# Patient Record
Sex: MALE | Race: BLACK OR AFRICAN AMERICAN | Employment: OTHER | URBAN - METROPOLITAN AREA
[De-identification: names, ages, dates, MRNs, and addresses within clinical notes are randomized per-mention and may not be internally consistent; named-entity substitution may affect disease eponyms.]

---

## 2020-06-10 ENCOUNTER — OFFICE VISIT (OUTPATIENT)
Dept: PRIMARY CARE CLINIC | Age: 65
End: 2020-06-10

## 2020-06-12 LAB
SARS-COV-2: NOT DETECTED
SOURCE: NORMAL

## 2020-06-15 ENCOUNTER — ANESTHESIA EVENT (OUTPATIENT)
Dept: ENDOSCOPY | Age: 65
End: 2020-06-15
Payer: MEDICARE

## 2020-06-15 RX ORDER — SODIUM CHLORIDE 9 MG/ML
INJECTION, SOLUTION INTRAVENOUS CONTINUOUS
Status: CANCELLED | OUTPATIENT
Start: 2020-06-15

## 2020-06-16 ENCOUNTER — ANESTHESIA (OUTPATIENT)
Dept: ENDOSCOPY | Age: 65
End: 2020-06-16
Payer: MEDICARE

## 2020-06-16 ENCOUNTER — HOSPITAL ENCOUNTER (OUTPATIENT)
Age: 65
Setting detail: OUTPATIENT SURGERY
Discharge: HOME | End: 2020-06-16
Attending: INTERNAL MEDICINE | Admitting: INTERNAL MEDICINE
Payer: MEDICARE

## 2020-06-16 VITALS
OXYGEN SATURATION: 99 % | DIASTOLIC BLOOD PRESSURE: 68 MMHG | SYSTOLIC BLOOD PRESSURE: 94 MMHG | TEMPERATURE: 96.8 F | RESPIRATION RATE: 14 BRPM

## 2020-06-16 VITALS
HEIGHT: 68 IN | HEART RATE: 71 BPM | OXYGEN SATURATION: 100 % | WEIGHT: 185 LBS | TEMPERATURE: 97.6 F | RESPIRATION RATE: 18 BRPM | BODY MASS INDEX: 28.04 KG/M2 | SYSTOLIC BLOOD PRESSURE: 112 MMHG | DIASTOLIC BLOOD PRESSURE: 72 MMHG

## 2020-06-16 LAB
GLUCOSE BLD-MCNC: 97 MG/DL (ref 70–99)
PERFORMED ON: NORMAL

## 2020-06-16 PROCEDURE — 3609020100 HC COLONOSCOPY W/EUS FNA: Performed by: INTERNAL MEDICINE

## 2020-06-16 PROCEDURE — 7100000010 HC PHASE II RECOVERY - FIRST 15 MIN: Performed by: INTERNAL MEDICINE

## 2020-06-16 PROCEDURE — 3700000001 HC ADD 15 MINUTES (ANESTHESIA): Performed by: INTERNAL MEDICINE

## 2020-06-16 PROCEDURE — 6360000002 HC RX W HCPCS: Performed by: NURSE ANESTHETIST, CERTIFIED REGISTERED

## 2020-06-16 PROCEDURE — 2580000003 HC RX 258: Performed by: ANESTHESIOLOGY

## 2020-06-16 PROCEDURE — 2709999900 HC NON-CHARGEABLE SUPPLY: Performed by: INTERNAL MEDICINE

## 2020-06-16 PROCEDURE — 3609010200 HC COLONOSCOPY ABLATION TUMOR POLYP/OTHER LES: Performed by: INTERNAL MEDICINE

## 2020-06-16 PROCEDURE — 88305 TISSUE EXAM BY PATHOLOGIST: CPT

## 2020-06-16 PROCEDURE — 2720000010 HC SURG SUPPLY STERILE: Performed by: INTERNAL MEDICINE

## 2020-06-16 PROCEDURE — 3700000000 HC ANESTHESIA ATTENDED CARE: Performed by: INTERNAL MEDICINE

## 2020-06-16 PROCEDURE — 7100000011 HC PHASE II RECOVERY - ADDTL 15 MIN: Performed by: INTERNAL MEDICINE

## 2020-06-16 RX ORDER — SODIUM CHLORIDE, SODIUM LACTATE, POTASSIUM CHLORIDE, CALCIUM CHLORIDE 600; 310; 30; 20 MG/100ML; MG/100ML; MG/100ML; MG/100ML
INJECTION, SOLUTION INTRAVENOUS CONTINUOUS
Status: DISCONTINUED | OUTPATIENT
Start: 2020-06-16 | End: 2020-06-16 | Stop reason: HOSPADM

## 2020-06-16 RX ORDER — SODIUM CHLORIDE 0.9 % (FLUSH) 0.9 %
10 SYRINGE (ML) INJECTION EVERY 12 HOURS SCHEDULED
Status: DISCONTINUED | OUTPATIENT
Start: 2020-06-16 | End: 2020-06-16 | Stop reason: HOSPADM

## 2020-06-16 RX ORDER — LEVOCETIRIZINE DIHYDROCHLORIDE 5 MG/1
TABLET, FILM COATED ORAL DAILY
COMMUNITY

## 2020-06-16 RX ORDER — PROPOFOL 10 MG/ML
INJECTION, EMULSION INTRAVENOUS PRN
Status: DISCONTINUED | OUTPATIENT
Start: 2020-06-16 | End: 2020-06-16 | Stop reason: SDUPTHER

## 2020-06-16 RX ORDER — LIDOCAINE HYDROCHLORIDE 20 MG/ML
INJECTION, SOLUTION INTRAVENOUS PRN
Status: DISCONTINUED | OUTPATIENT
Start: 2020-06-16 | End: 2020-06-16 | Stop reason: SDUPTHER

## 2020-06-16 RX ORDER — SODIUM CHLORIDE 0.9 % (FLUSH) 0.9 %
10 SYRINGE (ML) INJECTION PRN
Status: DISCONTINUED | OUTPATIENT
Start: 2020-06-16 | End: 2020-06-16 | Stop reason: HOSPADM

## 2020-06-16 RX ORDER — INSULIN GLARGINE 100 [IU]/ML
INJECTION, SOLUTION SUBCUTANEOUS
COMMUNITY
Start: 2020-02-19

## 2020-06-16 RX ADMIN — PROPOFOL 100 MG: 10 INJECTION, EMULSION INTRAVENOUS at 08:07

## 2020-06-16 RX ADMIN — PROPOFOL 40 MG: 10 INJECTION, EMULSION INTRAVENOUS at 08:36

## 2020-06-16 RX ADMIN — PROPOFOL 50 MG: 10 INJECTION, EMULSION INTRAVENOUS at 08:21

## 2020-06-16 RX ADMIN — PROPOFOL 50 MG: 10 INJECTION, EMULSION INTRAVENOUS at 08:28

## 2020-06-16 RX ADMIN — LIDOCAINE HYDROCHLORIDE 60 MG: 20 INJECTION, SOLUTION INTRAVENOUS at 08:07

## 2020-06-16 RX ADMIN — SODIUM CHLORIDE, POTASSIUM CHLORIDE, SODIUM LACTATE AND CALCIUM CHLORIDE: 600; 310; 30; 20 INJECTION, SOLUTION INTRAVENOUS at 07:34

## 2020-06-16 RX ADMIN — PROPOFOL 30 MG: 10 INJECTION, EMULSION INTRAVENOUS at 08:41

## 2020-06-16 RX ADMIN — PROPOFOL 50 MG: 10 INJECTION, EMULSION INTRAVENOUS at 08:11

## 2020-06-16 RX ADMIN — PROPOFOL 50 MG: 10 INJECTION, EMULSION INTRAVENOUS at 08:17

## 2020-06-16 RX ADMIN — PROPOFOL 30 MG: 10 INJECTION, EMULSION INTRAVENOUS at 08:45

## 2020-06-16 RX ADMIN — PROPOFOL 30 MG: 10 INJECTION, EMULSION INTRAVENOUS at 08:13

## 2020-06-16 SDOH — HEALTH STABILITY: MENTAL HEALTH: HOW MANY STANDARD DRINKS CONTAINING ALCOHOL DO YOU HAVE ON A TYPICAL DAY?: 1 OR 2

## 2020-06-16 SDOH — HEALTH STABILITY: MENTAL HEALTH: HOW OFTEN DO YOU HAVE A DRINK CONTAINING ALCOHOL?: 4 OR MORE TIMES A WEEK

## 2020-06-16 ASSESSMENT — PULMONARY FUNCTION TESTS
PIF_VALUE: 0
PIF_VALUE: 5
PIF_VALUE: 0

## 2020-06-16 ASSESSMENT — LIFESTYLE VARIABLES: SMOKING_STATUS: 0

## 2020-06-16 ASSESSMENT — PAIN - FUNCTIONAL ASSESSMENT: PAIN_FUNCTIONAL_ASSESSMENT: 0-10

## 2020-06-16 NOTE — PROGRESS NOTES
Ambulatory Surgery/Procedure Discharge Note    Vitals:    06/16/20 0918   BP: 112/72   Pulse: 71   Resp: 18   Temp: 97.6 °F (36.4 °C)   SpO2: 100%       In: 675 [P.O.:75; I.V.:600]  Out: 0     Restroom use offered before discharge. Yes    Pain assessment:  none  Pain Level: 0        Patient discharged to home/self care.  Patient discharged via wheel chair by transporter to waiting family/S.O.       6/16/2020 9:39 AM

## 2020-06-16 NOTE — ANESTHESIA PRE PROCEDURE
reviewed and Nursing notes reviewed no history of anesthetic complications:   Airway: Mallampati: I  TM distance: >3 FB   Neck ROM: full  Mouth opening: > = 3 FB Dental: normal exam   (+) upper dentures  Comment: Partial lower    Pulmonary: breath sounds clear to auscultation      (-) not a current smoker (1 cig qd)          Patient did not smoke on day of surgery. Cardiovascular:  Exercise tolerance: good (>4 METS),   (+) hypertension (no meds for ): mild,     (-) past MI    NYHA Classification: I    Rhythm: regular  Rate: normal           Beta Blocker:  Not on Beta Blocker         Neuro/Psych:   Negative Neuro/Psych ROS              GI/Hepatic/Renal:        (-) GERD      ROS comment: Rectal mass. Endo/Other:    (+) DiabetesType II DM, using insulin, . Abdominal:           Vascular: negative vascular ROS. Anesthesia Plan      MAC     ASA 3       Induction: intravenous. MIPS: Prophylactic antiemetics administered. Anesthetic plan and risks discussed with patient. Plan discussed with CRNA.     Attending anesthesiologist reviewed and agrees with Pre Eval content              Lloyd Sanchez DO   6/16/2020

## 2020-06-16 NOTE — PROCEDURES
EBL none  [facility]  ENDOSCOPIC ULTRASOUND (EUS) REPORT    Patient:  Chris Olivier    Referring Physician:  Noemi Jennings     Indication:  Rectal nodule     Medications:  MAC      Pre-Anesthesia Assessment:  I have reviewed and am in agreement with patient history and medication, including previous response to sedation in the nursing record. A full history and physical and physical was performed. I discussed the risks and benefits of the procedure with the patient including but not limited to infection, bleeding, perforation, medication reaction, and death. In addition I discussed that fine needle aspiration may result in an increased risk of infection, bleeding, perforation, and death. The patient elected to have the procedure performed and informed consent was obtained. The patient was brought to the room, a time out was performed and the patient and staff were in agreement that it was the correct patient and procedure. Mallampatti: II  ASA Grade Assessment: 2    The plan was to use MAC anesthesia. Vital signs and heart rhythm were monitored prior to and throughout the entire procedure. The patient was reassessed and then adequate sedation was then provided in stepwise fashion. The heart rate, respiratory rate, oxygen saturations, blood pressure, adequacy of pulmonary ventilation, and response to care were monitored throughout the procedure. The physical status of the patient was re-assessed after the procedure. The gastroscope and Olympus radial echoendoscope were introduced through the rectum, and advanced to the sigmoid colon. The lower EUS was accomplished without difficulty. The patient tolerated the procedure well. An endosonoscope with 7. 5MHz processor and color doppler were used throughout the procedure. I performed real time sonographic image interpretation without the assistance of a radiologist.     There were no immediate apparent complications.     Rectal Exam: normal    Endoscopic Findings:  Colon: distal sigmoid normal  Rectum: 6mm subepithelial nodule at 1o'clock with patient in left lateral position. Approximately 2cms from the anal verge. Sonographic Findings:  Rectum:the nodule measures 6 x 3.7mm. The lesion is hypoechoic and doppler negative. The lesion arises from the submucosa and involves the muscularis mucosae. There is some submucosal layering deep to the lesion and an intact/uninvolved muscularis propria deep to the lesion. Lymph nodes: no iliac or otis-rectal lymph nodes    The gastroscope was readvanced. After the lesion was identified the EMR snare was used to place superficial marks around the lesion to larry the borders prior to lift. The scope was removed and the banding cap was placed. The scope was then readvanced to the lesion. A total of 5 mL of orise was placed for lift. The lesion lifted well. Next the lesion was suctioned into the banding cap and a band deployed. The lesion was then removed with a hot snare. The mucosal resection site showed a good blue hue suggesting that the resection was deep enough but there was no obvious perforation. There was no bleeding during or at the end of the procedure. Impression:  6 x 3.7mm hypoechoic nodule arising from the submucosal layer and involving the muscularis mucosae status post mucosal resection.      Plan:  Call for pathology in 7 days  Avoid NSAIDs for 14 days  Further follow up based on pathology results    January Cabrera MD

## 2021-08-10 ENCOUNTER — HOSPITAL ENCOUNTER (EMERGENCY)
Age: 66
Discharge: HOME | End: 2021-08-10
Payer: MEDICARE

## 2021-08-10 VITALS
RESPIRATION RATE: 16 BRPM | TEMPERATURE: 98.2 F | SYSTOLIC BLOOD PRESSURE: 123 MMHG | HEART RATE: 86 BPM | OXYGEN SATURATION: 97 % | DIASTOLIC BLOOD PRESSURE: 72 MMHG

## 2021-08-10 DIAGNOSIS — B02.29 POSTHERPETIC NEURALGIA: Primary | ICD-10-CM

## 2021-08-10 PROCEDURE — 99282 EMERGENCY DEPT VISIT SF MDM: CPT

## 2021-08-10 RX ORDER — OXYCODONE HYDROCHLORIDE AND ACETAMINOPHEN 5; 325 MG/1; MG/1
1 TABLET ORAL EVERY 6 HOURS PRN
Qty: 10 TABLET | Refills: 0 | Status: SHIPPED | OUTPATIENT
Start: 2021-08-10 | End: 2021-08-13

## 2021-08-10 ASSESSMENT — PAIN DESCRIPTION - ORIENTATION: ORIENTATION: RIGHT

## 2021-08-10 ASSESSMENT — ENCOUNTER SYMPTOMS
FACIAL SWELLING: 0
SHORTNESS OF BREATH: 0
BACK PAIN: 0
APNEA: 0
NAUSEA: 0
CHOKING: 0
EYE REDNESS: 0
VOMITING: 0
ABDOMINAL PAIN: 0
EYE DISCHARGE: 0

## 2021-08-10 ASSESSMENT — PAIN DESCRIPTION - PAIN TYPE: TYPE: ACUTE PAIN

## 2021-08-10 ASSESSMENT — PAIN DESCRIPTION - LOCATION: LOCATION: BACK

## 2021-08-10 ASSESSMENT — PAIN SCALES - GENERAL
PAINLEVEL_OUTOF10: 10
PAINLEVEL_OUTOF10: 10

## 2021-08-10 NOTE — ED PROVIDER NOTES
**ADVANCED PRACTICE PROVIDER, I HAVE EVALUATED THIS PATIENT**        [address] [facility] ENCOUNTER      Pt Name: Silviano Guidry  ZFP:[id number]  [place] 1955  Date of evaluation: 8/10/2021  Provider: Gustavo Billingsley PA-C      Chief Complaint:    Chief Complaint   Patient presents with    Herpes Zoster         Nursing Notes, Past Medical Hx, Past Surgical Hx, Social Hx, Allergies, and Family Hx were all reviewed and agreed with or any disagreements were addressed in the HPI.    HPI: (Location, Duration, Timing, Severity, Quality, Assoc Sx, Context, Modifying factors)  This is a  61 y.o. male who presents presents to the emergency room with chief complaint of pain. Patient state he broke out with shingles about over a week ago. He was seen by his primary doctor who put him on valacyclovir and Medrol Dosepak. He states that the pain is so severe he cannot sleep. Denies fever, no shortness of breath. No numbness or tingling in feet or finger. Shingles outbreak is on his back on upper right side.     PastMedical/Surgical History:      Diagnosis Date    Diabetes     Hyperlipidemia     Hypertension     Inflammatory arthropathy     Submucosal rectal lesion          Procedure Laterality Date    COLONOSCOPY  6/16/2020    COLONOSCOPY W/ ENDOSCOPIC MUCOSAL RESECTION performed by Roc Vazquez MD at [address] [place] [address]  6/16/2020    COLONOSCOPY W/EUS performed by Roc Vazquez MD at [facility] ENDOSCOPY       Medications:  Previous Medications    CRESTOR 10 MG TABLET        INSULIN GLARGINE (LANTUS SOLOSTAR) 100 UNIT/ML INJECTION PEN    INJECT 20 UNITS UNDER THE SKIN NIGHTLY    LEVOCETIRIZINE (XYZAL) 5 MG TABLET    Take by mouth daily    METFORMIN (GLUCOPHAGE) 500 MG TABLET    Take 1,000 mg by mouth 2 times daily (with meals)    SITAGLIPTIN (JANUVIA) 100 MG TABLET    Take 1 tablet by mouth daily         Review of Systems:  (2-9 systems needed)  Review of Systems   Constitutional: Negative for chills and fever. HENT: Negative for congestion, facial swelling and sneezing. Eyes: Negative for discharge and redness. Respiratory: Negative for apnea, choking and shortness of breath. Cardiovascular: Negative for chest pain. Gastrointestinal: Negative for abdominal pain, nausea and vomiting. Genitourinary: Negative for dysuria. Musculoskeletal: Negative for back pain, neck pain and neck stiffness. Neurological: Negative for dizziness, tremors, seizures and headaches. All other systems reviewed and are negative. \"Positives and Pertinent negatives as per HPI\"    Physical Exam:  Physical Exam  Vitals and nursing note reviewed. Constitutional:       Appearance: He is well-developed. Cardiovascular:      Rate and Rhythm: Normal rate and regular rhythm. Heart sounds: Normal heart sounds. No murmur heard. No friction rub. No gallop. Pulmonary:      Effort: Pulmonary effort is normal. No respiratory distress. Breath sounds: Normal breath sounds. No wheezing or rales. Chest:      Chest wall: No tenderness. Musculoskeletal:         General: Normal range of motion. Cervical back: Normal range of motion and neck supple. Skin:     General: Skin is warm and dry. Findings: Rash present. No lesion. Rash is vesicular. Neurological:      General: No focal deficit present. Mental Status: He is oriented to person, place, and time. Psychiatric:         Behavior: Behavior normal.         MEDICAL DECISION MAKING    Vitals:    Vitals:    08/10/21 0600 08/10/21 0620   BP: (!) 158/100 (!) 145/81   Pulse: 85 86   Resp: 16    Temp: 98.2 °F (36.8 °C)    TempSrc: Oral    SpO2: 100% 99%       LABS:Labs Reviewed - No data to display     Remainder of labs reviewed and were negative at this time or not returned at the time of this note.     RADIOLOGY:   Non-plain film images such as CT, Ultrasound and MRI are read by the radiologist. Israel Lu PA-C have directly visualized the radiologic plain film image(s) with the below findings:      Interpretation per the Radiologist below, if available at the time of this note:    No orders to display        No results found. MEDICAL DECISION MAKING / ED COURSE:      PROCEDURES:   Procedures    None    Patient was given:  Medications - No data to display    Emergency room course: Patient on exam cardiovascular regular rhythm, lungs are clear. No wheeze rales or rhonchi noted. Patient has a group of vesicular lesion on the right side upper back that extends just below the right axilla. No swelling noted no surrounding erythema. No other motor or sensory deficit noted. Is alert oriented x4. Does not appear to be in acute distress. At this time I did discuss patient and treatment plan. I will put him on oxycodone for pain. Informed him follow back up with his primary care physician. Informed him that this is most likely a post herpetic neuralgia and they can last anywhere from days to weeks to months to years. He will be discharged stable condition. The patient tolerated their visit well. I evaluated the patient. The physician was available for consultation as needed. The patient and / or the family were informed of the results of any tests, a time was given to answer questions, a plan was proposed and they agreed with plan. CLINICAL IMPRESSION:  1. Postherpetic neuralgia        DISPOSITION Decision To Discharge 08/10/2021 06:22:48 AM      PATIENT REFERRED TO:  Bhargav Bermeo  [address] [place] [id number] [phone]    Call   As needed, If symptoms worsen      DISCHARGE MEDICATIONS:  New Prescriptions    OXYCODONE-ACETAMINOPHEN (PERCOCET) 5-325 MG PER TABLET    Take 1 tablet by mouth every 6 hours as needed for Pain for up to 3 days.        DISCONTINUED MEDICATIONS:  Discontinued Medications    No medications on file              (Please note the MDM and HPI sections of this note were completed with a voice recognition program.  Efforts were made to edit the dictations but occasionally words are mis-transcribed.)    Electronically signed, Yessy Magallanes PA-C,          Yessy Magallanes PA-C  08/10/21 7308

## 2021-08-10 NOTE — ED TRIAGE NOTES
Patient came in from home complaining of shingles pain. States he has had singles for two weeks and given medication last week. States pain is 10/10 on the right side of his back. States he has been taking his medication as prescribed.  A&O x4, vss.

## 2023-11-29 ENCOUNTER — HOSPITAL ENCOUNTER (EMERGENCY)
Age: 68
Discharge: HOME | End: 2023-11-29
Attending: EMERGENCY MEDICINE
Payer: MEDICARE

## 2023-11-29 VITALS
BODY MASS INDEX: 28.12 KG/M2 | DIASTOLIC BLOOD PRESSURE: 67 MMHG | SYSTOLIC BLOOD PRESSURE: 136 MMHG | TEMPERATURE: 98.5 F | RESPIRATION RATE: 17 BRPM | HEART RATE: 88 BPM | WEIGHT: 184.97 LBS | OXYGEN SATURATION: 99 %

## 2023-11-29 DIAGNOSIS — S05.02XA ABRASION OF LEFT CORNEA, INITIAL ENCOUNTER: Primary | ICD-10-CM

## 2023-11-29 PROCEDURE — 6370000000 HC RX 637 (ALT 250 FOR IP): Performed by: PHYSICIAN ASSISTANT

## 2023-11-29 PROCEDURE — 99283 EMERGENCY DEPT VISIT LOW MDM: CPT

## 2023-11-29 RX ORDER — TETRACAINE HYDROCHLORIDE 5 MG/ML
1 SOLUTION OPHTHALMIC ONCE
Status: COMPLETED | OUTPATIENT
Start: 2023-11-29 | End: 2023-11-29

## 2023-11-29 RX ORDER — ERYTHROMYCIN 5 MG/G
OINTMENT OPHTHALMIC
Qty: 3.5 G | Refills: 0 | Status: SHIPPED | OUTPATIENT
Start: 2023-11-29 | End: 2023-12-09

## 2023-11-29 RX ADMIN — FLUORESCEIN SODIUM 1 MG: 1 STRIP OPHTHALMIC at 21:13

## 2023-11-29 RX ADMIN — TETRACAINE HYDROCHLORIDE 1 DROP: 5 SOLUTION OPHTHALMIC at 21:14

## 2023-11-30 NOTE — ED NOTES
D/C: Order noted for d/c. Pt confirmed d/c paperwork have correct name. Discharge and education instructions reviewed with patient. Teach-back successful. Pt verbalized understanding. Pt denied questions at this time. No acute distress noted. Patient instructed to follow-up as noted - return to emergency department if symptoms worsen. Patient verbalized understanding. Discharged per EDMD with discharge instructions. Pt discharged  to private vehicle. Patient stable upon departure. Thanked patient for choosing [facility]) for care.           Mita Harrington RN  11/29/23 [id number]

## 2023-11-30 NOTE — ED PROVIDER NOTES
[facility]    Pt Name: Mal Garcia   MRN: [id number]   [address] [place] 1955   Date of evaluation: 11/29/2023   Provider: Coreen Lefort, MD   PCP: Violet Mack   Note Started: 9:16 PM EST 11/29/23       CHIEF COMPLAINT  Eye Pain (Pt states was sawing a piece of wood and may have gotten a small piece of wood in left eye today at 1630. )       Beulah Villa is a 79 y.o. male who  has a past medical history of Diabetes, Hyperlipidemia, Hypertension, Inflammatory arthropathy, and Submucosal rectal lesion. who presents to the ED complaining of left eye pain and irritation. Patient states he was sawing some wood earlier today. He believes he had a lot of sawdust on his hat and when he took it off some of this went into his eye. Complaining of photophobia left eye pain and irritation. He does not wear any corrective lenses. He does not wear contact lenses. I have reviewed the following from the nursing documentation:        HISTORY :      Past Medical History:   Diagnosis Date    Diabetes     Hyperlipidemia     Hypertension     Inflammatory arthropathy     Submucosal rectal lesion      Past Surgical History:   Procedure Laterality Date    COLONOSCOPY  6/16/2020    COLONOSCOPY W/ ENDOSCOPIC MUCOSAL RESECTION performed by Christelle Helms MD at [address] [place] [address]  6/16/2020    COLONOSCOPY W/EUS performed by Christelle Helms MD at [facility] ENDOSCOPY     No family history on file.   Social History     Socioeconomic History    Marital status:      Spouse name: Not on file    Number of children: Not on file    Years of education: Not on file    Highest education level: Not on file   Occupational History    Occupation:    Tobacco Use    Smoking status: Every Day    Smokeless tobacco: Never   Substance and Sexual Activity    Alcohol use: Yes    Drug use: No    Sexual activity: Yes   Other Topics Concern    Not

## 2024-04-20 ENCOUNTER — HOSPITAL ENCOUNTER (EMERGENCY)
Age: 69
Discharge: HOME | End: 2024-04-20
Payer: MEDICARE

## 2024-04-20 VITALS
WEIGHT: 179.68 LBS | SYSTOLIC BLOOD PRESSURE: 135 MMHG | OXYGEN SATURATION: 99 % | RESPIRATION RATE: 16 BRPM | DIASTOLIC BLOOD PRESSURE: 94 MMHG | BODY MASS INDEX: 27.32 KG/M2 | TEMPERATURE: 97.3 F | HEART RATE: 79 BPM

## 2024-04-20 DIAGNOSIS — S61.215A LACERATION OF LEFT RING FINGER WITHOUT FOREIGN BODY WITHOUT DAMAGE TO NAIL, INITIAL ENCOUNTER: Primary | ICD-10-CM

## 2024-04-20 PROCEDURE — 90715 TDAP VACCINE 7 YRS/> IM: CPT | Performed by: PHYSICIAN ASSISTANT

## 2024-04-20 PROCEDURE — 90471 IMMUNIZATION ADMIN: CPT | Performed by: PHYSICIAN ASSISTANT

## 2024-04-20 PROCEDURE — 99284 EMERGENCY DEPT VISIT MOD MDM: CPT

## 2024-04-20 PROCEDURE — 6360000002 HC RX W HCPCS: Performed by: PHYSICIAN ASSISTANT

## 2024-04-20 PROCEDURE — 12001 RPR S/N/AX/GEN/TRNK 2.5CM/<: CPT

## 2024-04-20 RX ORDER — LIDOCAINE HYDROCHLORIDE AND EPINEPHRINE 10; 10 MG/ML; UG/ML
20 INJECTION, SOLUTION INFILTRATION; PERINEURAL ONCE
Status: DISCONTINUED | OUTPATIENT
Start: 2024-04-20 | End: 2024-04-20

## 2024-04-20 RX ADMIN — TETANUS TOXOID, REDUCED DIPHTHERIA TOXOID AND ACELLULAR PERTUSSIS VACCINE, ADSORBED 0.5 ML: 5; 2.5; 8; 8; 2.5 SUSPENSION INTRAMUSCULAR at 19:48

## 2024-04-20 ASSESSMENT — PAIN SCALES - GENERAL: PAINLEVEL_OUTOF10: 8

## 2024-04-20 ASSESSMENT — LIFESTYLE VARIABLES
HOW MANY STANDARD DRINKS CONTAINING ALCOHOL DO YOU HAVE ON A TYPICAL DAY: 1 OR 2
HOW OFTEN DO YOU HAVE A DRINK CONTAINING ALCOHOL: 2-4 TIMES A MONTH

## 2024-04-20 ASSESSMENT — PAIN - FUNCTIONAL ASSESSMENT: PAIN_FUNCTIONAL_ASSESSMENT: NONE - DENIES PAIN

## 2024-04-20 NOTE — ED TRIAGE NOTES
Tip of L ring finger cut on piece of metal in shed. Doesn't believe he is up to date on tetanus vacc. Arrives w/ bandaids in place.

## 2024-04-20 NOTE — DISCHARGE INSTRUCTIONS
For the next few days, wash the affected area normally with soap and water but avoid submerging it and keep the stitches covered when not being cleaned. The stitches will dissolve on their own as the wound heals and do not need to be removed. Return to the emergency department if you should experience signs of infection such as fever, increased redness and swelling, or pus discharge at the site of the sutures.

## 2024-04-20 NOTE — ED PROVIDER NOTES
[facility] EMERGENCY DEPARTMENT  EMERGENCY DEPARTMENT ENCOUNTER      Pt Name: Sandor Robles  MRN: [id number]  Birthdate 1955  Date of evaluation: 4/20/2024  Provider: ÁNGEL Locke  PCP: Lora Moulton APRN - NP  Note Started: 6:37 PM EDT     The ED Attending Physician was available for consultation but did not see or evaluate this patient.    CHIEF COMPLAINT       Chief Complaint   Patient presents with    Laceration     Tip of L ring finger cut on piece of metal in shed. Doesn't believe he is up to date on tetanus vacc. Arrives w/ bandaids in place.        HISTORY OF PRESENT ILLNESS   (Location, Timing/Onset, Context/Setting, Quality, Duration, Modifying Factors, Severity, Associated Signs and Symptoms)  Note limiting factors.     Sandor Robles is a 69 y.o. male who presents with complaint of a laceration to the distal left ring finger.  He says it happened yesterday around 6:00 or 7:00 PM, when he was painting, and his finger hit a sharp piece of metal and got caught.  He put a bandage on it.  He tried to work again today but it was difficult.  There seem to be a lot of bleeding.  Does not think it involved the nail.  Denies complete numbness.  Denies injuries to any other parts of the body.  No relevant medical problems.  Cannot recall when his last tetanus vaccination was.    Nursing Notes were all reviewed and agreed with or any disagreements were addressed in the HPI.    REVIEW OF SYSTEMS    (2-9 systems for level 4, 10 or more for level 5)     Positives and pertinent negatives as per HPI.     PAST MEDICAL HISTORY     Past Medical History:   Diagnosis Date    Diabetes     Hyperlipidemia     Hypertension     Inflammatory arthropathy     Submucosal rectal lesion        SURGICAL HISTORY     Past Surgical History:   Procedure Laterality Date    COLONOSCOPY  6/16/2020    COLONOSCOPY W/ ENDOSCOPIC MUCOSAL RESECTION performed by Simon Negron MD at [facility] ENDOSCOPY     images below.  No significant nail damage.  No joint involvement, with good tendon strength with resistance against flexion extension at the distal joint.  Capillary refill less than 3 seconds and sensation to light touch grossly intact in the distal finger.   Skin:     General: Skin is warm and dry.   Neurological:      General: No focal deficit present.      Mental Status: He is alert and oriented to person, place, and time.   Psychiatric:         Mood and Affect: Mood normal.         Behavior: Behavior normal.               DIAGNOSTIC RESULTS   LABS:    Labs Reviewed - No data to display    When ordered only abnormal lab results are displayed. All other labs were within normal range or not returned as of this dictation.    EKG: When ordered, EKG's are interpreted by the Emergency Department Physician in the absence of a cardiologist.  Please see their note for interpretation of EKG.    RADIOLOGY:   All images such as plain radiographs, CT, Ultrasound and MRI are interpreted by a radiologist. Some images are visualized and preliminarily interpreted by me and/or the ED attending physician.    Interpretation per the radiologist below, if available at the time of this note:    No orders to display       RECORDS REVIEWED   None.    CONSULTS   None.    PROCEDURES   Laceration Repair  The patient has a laceration located on the left ring finger, as noted above. The surrounding area was cleaned with an alcohol swab and anesthetized in a field block by injection with 1% lidocaine through a 27-gauge needle. The laceration was then thoroughly cleaned with surgical scrub, flushed with sterile saline, and sterilely draped.  No foreign bodies were noted.  There was no tendon or muscular involvement.  The laceration was sutured with close approximation using PDS-II with 8 sutures in a simple interrupted technique.  The patient tolerated the procedure well.      EMERGENCY DEPARTMENT COURSE and DIFFERENTIAL DIAGNOSIS/MDM:

## 2025-08-12 ENCOUNTER — HOSPITAL ENCOUNTER (EMERGENCY)
Age: 70
Discharge: HOME | End: 2025-08-12
Attending: STUDENT IN AN ORGANIZED HEALTH CARE EDUCATION/TRAINING PROGRAM
Payer: MEDICARE

## 2025-08-12 VITALS
OXYGEN SATURATION: 96 % | HEIGHT: 68 IN | WEIGHT: 167.11 LBS | DIASTOLIC BLOOD PRESSURE: 84 MMHG | TEMPERATURE: 97.7 F | BODY MASS INDEX: 25.33 KG/M2 | HEART RATE: 68 BPM | RESPIRATION RATE: 17 BRPM | SYSTOLIC BLOOD PRESSURE: 128 MMHG
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
## 2025-08-12 PROCEDURE — 99283 EMERGENCY DEPT VISIT LOW MDM: CPT

## 2025-08-12 ASSESSMENT — PAIN DESCRIPTION - LOCATION: LOCATION: ANKLE

## 2025-08-12 ASSESSMENT — PAIN DESCRIPTION - DESCRIPTORS: DESCRIPTORS: ACHING

## 2025-08-12 ASSESSMENT — LIFESTYLE VARIABLES
HOW MANY STANDARD DRINKS CONTAINING ALCOHOL DO YOU HAVE ON A TYPICAL DAY: 1 OR 2
HOW OFTEN DO YOU HAVE A DRINK CONTAINING ALCOHOL: 2-3 TIMES A WEEK

## 2025-08-12 ASSESSMENT — PAIN - FUNCTIONAL ASSESSMENT: PAIN_FUNCTIONAL_ASSESSMENT: 0-10

## 2025-08-12 ASSESSMENT — PAIN SCALES - GENERAL: PAINLEVEL_OUTOF10: 3

## (undated) DEVICE — SYRINGE MED GEL ORISE SUBMUCOSAL LIFTING AGENT WITH NDL

## (undated) DEVICE — ENDOSCOPIC MUCOSAL RESECTION DEVICE: Brand: CAPTIVATOR EMR

## (undated) DEVICE — TRAP SPEC RETRV CLR PLAS POLYP IN LN SUCT QUIK CTCH